# Patient Record
Sex: FEMALE | Race: WHITE | NOT HISPANIC OR LATINO | Employment: FULL TIME | URBAN - METROPOLITAN AREA
[De-identification: names, ages, dates, MRNs, and addresses within clinical notes are randomized per-mention and may not be internally consistent; named-entity substitution may affect disease eponyms.]

---

## 2017-08-16 PROBLEM — Z12.11 SCREENING FOR COLON CANCER: Status: ACTIVE | Noted: 2017-08-16

## 2018-04-02 PROBLEM — L25.5 CONTACT DERMATITIS DUE TO PLANT: Status: ACTIVE | Noted: 2018-04-02

## 2020-02-02 PROBLEM — L25.5 CONTACT DERMATITIS DUE TO PLANT: Status: RESOLVED | Noted: 2018-04-02 | Resolved: 2020-02-02

## 2020-02-02 PROBLEM — Z12.11 SCREENING FOR COLON CANCER: Status: RESOLVED | Noted: 2017-08-16 | Resolved: 2020-02-02

## 2020-10-19 ENCOUNTER — PATIENT MESSAGE (OUTPATIENT)
Dept: OTHER | Facility: OTHER | Age: 60
End: 2020-10-19

## 2022-05-19 PROBLEM — R53.82 CHRONIC FATIGUE, UNSPECIFIED: Status: ACTIVE | Noted: 2022-05-19

## 2022-06-10 ENCOUNTER — TELEPHONE (OUTPATIENT)
Dept: OBSTETRICS AND GYNECOLOGY | Facility: CLINIC | Age: 62
End: 2022-06-10
Payer: COMMERCIAL

## 2022-06-10 NOTE — TELEPHONE ENCOUNTER
----- Message from Godfrey Majano sent at 6/10/2022 12:04 PM CDT -----  Regarding: Appt access  Contact: [phone]  Request Appt    Who Called: Rachelle Bartlett Type: Annual Wellness Exam  Call Back Number: [phone]  Additional Information: The patient called to schedule a annual wellness exam.

## 2022-08-02 ENCOUNTER — OFFICE VISIT (OUTPATIENT)
Dept: OBSTETRICS AND GYNECOLOGY | Facility: CLINIC | Age: 62
End: 2022-08-02
Payer: COMMERCIAL

## 2022-08-02 VITALS
DIASTOLIC BLOOD PRESSURE: 82 MMHG | BODY MASS INDEX: 36.74 KG/M2 | SYSTOLIC BLOOD PRESSURE: 128 MMHG | WEIGHT: 234.56 LBS

## 2022-08-02 DIAGNOSIS — Z01.419 SMEAR, VAGINAL, AS PART OF ROUTINE GYNECOLOGICAL EXAMINATION: Primary | ICD-10-CM

## 2022-08-02 DIAGNOSIS — Z12.72 SMEAR, VAGINAL, AS PART OF ROUTINE GYNECOLOGICAL EXAMINATION: Primary | ICD-10-CM

## 2022-08-02 DIAGNOSIS — Z01.419 ENCOUNTER FOR ANNUAL ROUTINE GYNECOLOGICAL EXAMINATION: ICD-10-CM

## 2022-08-02 PROCEDURE — 1159F MED LIST DOCD IN RCRD: CPT | Mod: CPTII,S$GLB,, | Performed by: OBSTETRICS & GYNECOLOGY

## 2022-08-02 PROCEDURE — 3079F PR MOST RECENT DIASTOLIC BLOOD PRESSURE 80-89 MM HG: ICD-10-PCS | Mod: CPTII,S$GLB,, | Performed by: OBSTETRICS & GYNECOLOGY

## 2022-08-02 PROCEDURE — 3008F BODY MASS INDEX DOCD: CPT | Mod: CPTII,S$GLB,, | Performed by: OBSTETRICS & GYNECOLOGY

## 2022-08-02 PROCEDURE — 1160F RVW MEDS BY RX/DR IN RCRD: CPT | Mod: CPTII,S$GLB,, | Performed by: OBSTETRICS & GYNECOLOGY

## 2022-08-02 PROCEDURE — 99396 PREV VISIT EST AGE 40-64: CPT | Mod: S$GLB,,, | Performed by: OBSTETRICS & GYNECOLOGY

## 2022-08-02 PROCEDURE — 88142 CYTOPATH C/V THIN LAYER: CPT | Performed by: OBSTETRICS & GYNECOLOGY

## 2022-08-02 PROCEDURE — 3008F PR BODY MASS INDEX (BMI) DOCUMENTED: ICD-10-PCS | Mod: CPTII,S$GLB,, | Performed by: OBSTETRICS & GYNECOLOGY

## 2022-08-02 PROCEDURE — 1159F PR MEDICATION LIST DOCUMENTED IN MEDICAL RECORD: ICD-10-PCS | Mod: CPTII,S$GLB,, | Performed by: OBSTETRICS & GYNECOLOGY

## 2022-08-02 PROCEDURE — 99999 PR PBB SHADOW E&M-EST. PATIENT-LVL IV: ICD-10-PCS | Mod: PBBFAC,,, | Performed by: OBSTETRICS & GYNECOLOGY

## 2022-08-02 PROCEDURE — 99999 PR PBB SHADOW E&M-EST. PATIENT-LVL IV: CPT | Mod: PBBFAC,,, | Performed by: OBSTETRICS & GYNECOLOGY

## 2022-08-02 PROCEDURE — 99396 PR PREVENTIVE VISIT,EST,40-64: ICD-10-PCS | Mod: S$GLB,,, | Performed by: OBSTETRICS & GYNECOLOGY

## 2022-08-02 PROCEDURE — 3074F PR MOST RECENT SYSTOLIC BLOOD PRESSURE < 130 MM HG: ICD-10-PCS | Mod: CPTII,S$GLB,, | Performed by: OBSTETRICS & GYNECOLOGY

## 2022-08-02 PROCEDURE — 3074F SYST BP LT 130 MM HG: CPT | Mod: CPTII,S$GLB,, | Performed by: OBSTETRICS & GYNECOLOGY

## 2022-08-02 PROCEDURE — 1160F PR REVIEW ALL MEDS BY PRESCRIBER/CLIN PHARMACIST DOCUMENTED: ICD-10-PCS | Mod: CPTII,S$GLB,, | Performed by: OBSTETRICS & GYNECOLOGY

## 2022-08-02 PROCEDURE — 3079F DIAST BP 80-89 MM HG: CPT | Mod: CPTII,S$GLB,, | Performed by: OBSTETRICS & GYNECOLOGY

## 2022-08-02 NOTE — PROGRESS NOTES
Chief Complaint   Patient presents with    Well Woman       History and Physical:  No LMP recorded. Patient has had a hysterectomy.       Claudia Milian is a 61 y.o. WFwho presents today for her routine annual GYN exam. The patient has no Gynecology complaints today. covid 7/2020, and 3 weeks ago      Allergies:   Review of patient's allergies indicates:   Allergen Reactions    Mobic [meloxicam] Nausea Only    Naproxen      Other reaction(s): STOMACH PAIN    Scopolamine base Nausea Only       Past Medical History:   Diagnosis Date    a Chronic Tachycardia ###    Dr. Sukhwinder Lo; On Metoprolol 50 Mg Bid    a H/O Paroxysmal Supraventricular Tachycardia (PSVT) ###    Dr. Sukhwinder Lo; On Metoprolol 50 Mg Bid    b Hypertension     8/22/19 RXd A Low Salt Diet And Home BP Monitoring Parameters    e Chronic Mildly Elevated TSH Levels With Normal FT4s     7/5/20 Ordered A Thyroid U/S; Am Monitoring    f Osteopenia     2/5/20 Offered RX For Fosamax 35 Mg Weekly, And RXd OTC D3 2K IU Daily, And OTC CaCO3 1,200 Mg Daily    i H/O 2 PPD X 20 YRs TUD, Quit In 1997     i H/O COVID-19 Infection     Her 8/25/20 COVID-19 Swab Test = Was Positive    j GERD     k H/O Bilateral Breast Augmentation Mammoplasty     l Chronic Recurrent Low Back Pain     l H/O Left Knee Arthroscopic Surgery     m Borderline Vitamin B12 Deficiency ###    2/2/20 RXd OTC Vitamin B12 2,500 Mcg SL Daily    m Chronic Fatigue     5/19/22 Referred To Dr. Jes Davidson    m Poor Concentration ###    o Allergic Rhinosinusitis     q Borderline Vitamin D Deficiency     5/16/21 RXd OTC D3 5K IU Daily    Wellness Visit 5/19/2022        Past Surgical History:   Procedure Laterality Date    BREAST BIOPSY Left     benign needle biopsy    BREAST SURGERY Bilateral 2005    implants    COLONOSCOPY N/A 8/16/2017    Procedure: COLONOSCOPY;  Surgeon: INES Orantes MD;  Location: [facility];  Service: Endoscopy;  Laterality: N/A;    HYSTERECTOMY       left knee      OOPHORECTOMY Bilateral 2009       MEDS:   Current Outpatient Medications on File Prior to Visit   Medication Sig Dispense Refill    cholecalciferol, vitamin D3, 125 mcg (5,000 unit) Tab Take 1 tablet (5,000 Units total) by mouth once daily.      cyanocobalamin, vitamin B-12, (VITAMIN B-12) 2,500 mcg Subl Place 2,500 mcg under the tongue once daily.      fluticasone propionate (FLONASE) 50 mcg/actuation nasal spray use 2 sprays in each nostril once daily 16 g 2    ibuprofen (ADVIL,MOTRIN) 800 MG tablet TAKE 1 TABLET BY MOUTH 3 TIMES DAILY AS NEEDED FOR PAIN 90 tablet 0    levocetirizine (XYZAL) 5 MG tablet Take 1 tablet (5 mg total) by mouth once daily. 90 tablet 1    metoprolol tartrate (LOPRESSOR) 50 MG tablet TAKE 1 TABLET BY MOUTH TWICE DAILY 180 tablet 1    montelukast (SINGULAIR) 10 mg tablet TAKE 1 TABLET BY MOUTH AT BEDTIME AS NEEDED FOR allergy symptoms 90 tablet 3    omeprazole (PRILOSEC) 20 MG capsule Take 1 capsule (20 mg total) by mouth once daily. 90 capsule 1    rosuvastatin (CRESTOR) 5 MG tablet Take 1 tablet (5 mg total) by mouth every evening. 90 tablet 3    aspirin (ECOTRIN) 81 MG EC tablet Take 1 tablet (81 mg total) by mouth once daily.  0    [] benzonatate (TESSALON PERLES) 100 MG capsule Take 1 capsule (100 mg total) by mouth 3 (three) times daily as needed for Cough. 30 capsule 0    meclizine (ANTIVERT) 25 mg tablet TAKE 1 TABLET BY MOUTH TWICE DAILY AS NEEDED (Patient not taking: No sig reported) 20 tablet 0    tramadol-acetaminophen 37.5-325 mg (ULTRACET) 37.5-325 mg Tab TAKE 1 TABLET BY MOUTH EVERY 6 HOURS AS NEEDED FOR PAIN (Patient not taking: No sig reported) 30 tablet 0    [DISCONTINUED] calcium carbonate (OS-AMANDA) 600 mg calcium (1,500 mg) Tab Take 2 tablets (1,200 mg total) by mouth once daily. (Patient not taking: No sig reported)       No current facility-administered medications on file prior to visit.       OB History        2     Para   2    Term   2            AB        Living           SAB        IAB        Ectopic        Multiple        Live Births                     Social History     Socioeconomic History    Marital status:    Tobacco Use    Smoking status: Former Smoker     Packs/day: 2.00     Years: 20.00     Pack years: 40.00    Smokeless tobacco: Never Used   Substance and Sexual Activity    Alcohol use: Yes     Alcohol/week: 1.0 standard drink     Types: 1 Cans of beer per week     Comment: social/occ    Drug use: Never    Sexual activity: Not Currently     Social Determinants of Health     Financial Resource Strain: Low Risk     Difficulty of Paying Living Expenses: Not very hard   Food Insecurity: No Food Insecurity    Worried About Running Out of Food in the Last Year: Never true    Ran Out of Food in the Last Year: Never true   Transportation Needs: No Transportation Needs    Lack of Transportation (Medical): No    Lack of Transportation (Non-Medical): No   Physical Activity: Sufficiently Active    Days of Exercise per Week: 5 days    Minutes of Exercise per Session: 60 min   Stress: No Stress Concern Present    Feeling of Stress : Only a little   Social Connections: Unknown    Frequency of Communication with Friends and Family: More than three times a week    Frequency of Social Gatherings with Friends and Family: Twice a week    Active Member of Clubs or Organizations: Yes    Attends Club or Organization Meetings: More than 4 times per year    Marital Status: Living with partner   Housing Stability: Low Risk     Unable to Pay for Housing in the Last Year: No    Number of Places Lived in the Last Year: 1    Unstable Housing in the Last Year: No       Family History   Problem Relation Age of Onset    Breast cancer Neg Hx     Cancer Neg Hx     Colon cancer Neg Hx     Ovarian cancer Neg Hx          Past medical and surgical history reviewed.   I have reviewed the patient's medical  history in detail and updated the computerized patient record.        Review of System:   General: no chills, fever, night sweats, weight gain or weight loss  Psychological: no depression or suicidal ideation  Breasts: no new or changing breast lumps, nipple discharge or masses.  Respiratory: no cough, shortness of breath, or wheezing  Cardiovascular: no chest pain or dyspnea on exertion  Gastrointestinal: no abdominal pain, change in bowel habits, or black or bloody stools  Genito-Urinary: no incontinence, urinary frequency/urgency or vulvar/vaginal symptoms, pelvic pain or abnormal vaginal bleeding.  Musculoskeletal: no gait disturbance or muscular weakness      Physical Exam:   /82   Wt 106.4 kg (234 lb 9.1 oz)   BMI 36.74 kg/m²   Constitutional: She is oriented to person, place, and time. She appears well-developed and well-nourished. No distress.  HENT:   Head: Normocephalic and atraumatic.   Eyes: Conjunctivae and EOM are normal. No scleral icterus.   Neck: Normal range of motion. Neck supple. No tracheal deviation present.   Cardiovascular: Normal rate.    Pulmonary/Chest: Effort normal. No respiratory distress. She exhibits no tenderness.  Breasts: are symmetrical.no masses   Right breast exhibits no inverted nipple, no mass, no nipple discharge, no skin change and no tenderness.   Left breast exhibits no inverted nipple, no mass, no nipple discharge, no skin change and no tenderness.  Abdominal: Soft. She exhibits no distension and no mass. There is no tenderness. There is no rebound and no guarding.   Genitourinary:    External rectal exam shows no thrombosed external hemorrhoids.    Pelvic exam was performed with patient supine.   No labial fusion.   There is no rash, lesion or injury on the right labia.   There is no rash, lesion or injury on the left labia.   No bleeding and no signs of injury around the vaginal introitus, urethra is without lesions and well supported.    No vaginal discharge  found.    No significant Cystocele, Enterocele or rectocele, and cuff well supported.   Bimanual exam:   The urethra and vagina are without palpable masses or tenderness.   Uterus and cervix are surgically absents, vaginal cuff is intact and well supported.   Right adnexum displays no mass and no tenderness.   Left adnexum displays no mass and no tenderness.  Musculoskeletal: Normal range of motion.   Lymphadenopathy: No inguinal adenopathy present.   Neurological: She is alert and oriented to person, place, and time. Coordination normal.   Skin: Skin is warm and dry. She is not diaphoretic.   Psychiatric: She has a normal mood and affect.        Assessment:   Normal annual GYN exam  1. Smear, vaginal, as part of routine gynecological examination     2. Encounter for annual routine gynecological examination       Prev hyst    Plan:   PAP   Mammogram done  Follow up in 1 year.

## 2022-08-10 LAB
FINAL PATHOLOGIC DIAGNOSIS: NORMAL
Lab: NORMAL

## 2022-11-15 ENCOUNTER — TELEPHONE (OUTPATIENT)
Dept: OBSTETRICS AND GYNECOLOGY | Facility: CLINIC | Age: 62
End: 2022-11-15
Payer: COMMERCIAL

## 2022-11-15 NOTE — TELEPHONE ENCOUNTER
Last seen 8/2/2022 for wwgeovanny we have not sent this in the past for her. LVM that she can call to further discusses  or schedule an appointment to to talk about medication to call and schedule.

## 2022-11-15 NOTE — TELEPHONE ENCOUNTER
----- Message from Estee Jose sent at 11/15/2022  8:51 AM CST -----  Contact: Patient  Type:  RX Refill Request    Who Called:  Patient  Refill or New Rx: RX  RX Name and Strength: OZEMPIC  How is the patient currently taking it? (ex. 1XDay): 1DAY  Is this a 30 day or 90 day RX: 90  Preferred Pharmacy with phone number:   [facility]. - [address] [address]  Phone: [phone] Fax: [phone]     Best Call Back Number:  [phone]   Additional Information:  Patient requesting doctor 's office to call in  rx

## 2023-04-06 ENCOUNTER — TELEPHONE (OUTPATIENT)
Dept: NEUROSURGERY | Facility: CLINIC | Age: 63
End: 2023-04-06
Payer: COMMERCIAL

## 2023-04-06 NOTE — TELEPHONE ENCOUNTER
Called patient to schedule a NP appt with Dr. Campoverde per referral from Jose Michel MD. No answer.  LVM. No current imaging in the chart.

## 2023-04-21 ENCOUNTER — OFFICE VISIT (OUTPATIENT)
Dept: NEUROSURGERY | Facility: CLINIC | Age: 63
End: 2023-04-21
Payer: COMMERCIAL

## 2023-04-21 VITALS — HEIGHT: 67 IN | RESPIRATION RATE: 18 BRPM | BODY MASS INDEX: 35.02 KG/M2 | WEIGHT: 223.13 LBS

## 2023-04-21 DIAGNOSIS — M48.07 SPINAL STENOSIS, LUMBOSACRAL REGION: ICD-10-CM

## 2023-04-21 DIAGNOSIS — M54.50 CHRONIC LOW BACK PAIN, UNSPECIFIED BACK PAIN LATERALITY, UNSPECIFIED WHETHER SCIATICA PRESENT: ICD-10-CM

## 2023-04-21 DIAGNOSIS — M54.9 DORSALGIA, UNSPECIFIED: Primary | ICD-10-CM

## 2023-04-21 DIAGNOSIS — M47.12 CERVICAL SPONDYLOSIS WITH MYELOPATHY AND RADICULOPATHY: ICD-10-CM

## 2023-04-21 DIAGNOSIS — M48.062 LUMBAR STENOSIS WITH NEUROGENIC CLAUDICATION: ICD-10-CM

## 2023-04-21 DIAGNOSIS — M47.22 CERVICAL SPONDYLOSIS WITH MYELOPATHY AND RADICULOPATHY: ICD-10-CM

## 2023-04-21 DIAGNOSIS — G89.29 CHRONIC LOW BACK PAIN, UNSPECIFIED BACK PAIN LATERALITY, UNSPECIFIED WHETHER SCIATICA PRESENT: ICD-10-CM

## 2023-04-21 PROCEDURE — 1160F RVW MEDS BY RX/DR IN RCRD: CPT | Mod: CPTII,S$GLB,, | Performed by: PHYSICIAN ASSISTANT

## 2023-04-21 PROCEDURE — 3008F PR BODY MASS INDEX (BMI) DOCUMENTED: ICD-10-PCS | Mod: CPTII,S$GLB,, | Performed by: PHYSICIAN ASSISTANT

## 2023-04-21 PROCEDURE — 99204 PR OFFICE/OUTPT VISIT, NEW, LEVL IV, 45-59 MIN: ICD-10-PCS | Mod: S$GLB,,, | Performed by: PHYSICIAN ASSISTANT

## 2023-04-21 PROCEDURE — 3008F BODY MASS INDEX DOCD: CPT | Mod: CPTII,S$GLB,, | Performed by: PHYSICIAN ASSISTANT

## 2023-04-21 PROCEDURE — 99204 OFFICE O/P NEW MOD 45 MIN: CPT | Mod: S$GLB,,, | Performed by: PHYSICIAN ASSISTANT

## 2023-04-21 PROCEDURE — 1159F PR MEDICATION LIST DOCUMENTED IN MEDICAL RECORD: ICD-10-PCS | Mod: CPTII,S$GLB,, | Performed by: PHYSICIAN ASSISTANT

## 2023-04-21 PROCEDURE — 1159F MED LIST DOCD IN RCRD: CPT | Mod: CPTII,S$GLB,, | Performed by: PHYSICIAN ASSISTANT

## 2023-04-21 PROCEDURE — 1160F PR REVIEW ALL MEDS BY PRESCRIBER/CLIN PHARMACIST DOCUMENTED: ICD-10-PCS | Mod: CPTII,S$GLB,, | Performed by: PHYSICIAN ASSISTANT

## 2023-04-21 NOTE — PROGRESS NOTES
[facility] Neurosurgery [facility]  Clinic Consult     Consult Requested By: Jose Michel MD  PCP: Jose Michel MD    SUBJECTIVE:     Chief Complaint:   Chief Complaint   Patient presents with    Lumbar Spine Pain (L-Spine)     Patient present to clinic today as neck pain. Patient states of numbness/tingling radiating bilateral arms/hands; mostly present at PM more frequent. Also states of back pain; worsening symptoms, hard standing long periods of time. Experiencing numbness/tingling and sharp pain of bilateral legs.        History of Present Illness:  Claudia Milian is a 62 y.o. right handed female with HTN, osteopenia who presents for evaluation of neck and back pain. She reports neck pain for the last few months. She has noticed over the years that her neck has become more stiff and grinds with movement. She will experience sharp pains in the neck and shooting pains down her arms. She notes numbness and tingling in her arms. She denies weakness, but reports dropping objects and difficulties with hand dexterity. She also reports several year history of low back pain which is progressively worsening. The pain is present in the low back and worse with standing. She cannot stand or walk for prolonged periods without severe back pain and numbness in her legs. This resolves when she sits. She has been taking Celebrex with some relief.     Pertinent and recent history, provider evaluations, imaging and data reviewed in EPIC        Past Medical History:   Diagnosis Date    a Chronic Tachycardia ###    Dr. Sukhwinder Lo; On Metoprolol 50 Mg Bid    a H/O Paroxysmal Supraventricular Tachycardia (PSVT) ###    Dr. Sukhwinder Lo; On Metoprolol 50 Mg Bid    b Hypertension     8/22/19 RXd A Low Salt Diet And Home BP Monitoring Parameters    e Chronic Mildly Elevated TSH Levels With Normal FT4s     7/5/20 Ordered A Thyroid U/S; Am Monitoring    f Osteopenia     2/5/20 Offered RX For  Fosamax 35 Mg Weekly, And RXd OTC D3 2K IU Daily, And OTC CaCO3 1,200 Mg Daily    i H/O 2 PPD X 20 YRs TUD, Quit In 1997     i H/O COVID-19 Infection     Her 8/25/20 COVID-19 Swab Test = Was Positive    j GERD     Dr. INES diaz H/O Bilateral Breast Augmentation Mammoplasty     l Chronic Recurrent Low Back Pain     l H/O Left Knee Arthroscopic Surgery     m Borderline Vitamin B12 Deficiency     2/2/20 RXd OTC Vitamin B12 2,500 Mcg SL Daily    m Chronic Fatigue     5/19/22 Referred To Dr. Jes cueva Poor Concentration     o Allergic Rhinosinusitis     q Borderline Vitamin D Deficiency     5/16/21 RXd OTC D3 5K IU Daily    Wellness Visit 3/31/2023      Past Surgical History:   Procedure Laterality Date    BREAST BIOPSY Left     benign needle biopsy    BREAST SURGERY Bilateral 2005    implants    COLONOSCOPY N/A 8/16/2017    Procedure: COLONOSCOPY;  Surgeon: INES Orantes MD;  Location: [facility];  Service: Endoscopy;  Laterality: N/A;    HYSTERECTOMY  2009    left knee      OOPHORECTOMY Bilateral 2009     Family History   Problem Relation Age of Onset    Breast cancer Neg Hx     Cancer Neg Hx     Colon cancer Neg Hx     Ovarian cancer Neg Hx      Social History     Tobacco Use    Smoking status: Former     Packs/day: 2.00     Years: 20.00     Pack years: 40.00     Types: Cigarettes    Smokeless tobacco: Never   Substance Use Topics    Alcohol use: Yes     Alcohol/week: 1.0 standard drink     Types: 1 Cans of beer per week     Comment: social/occ    Drug use: Never      Review of patient's allergies indicates:   Allergen Reactions    Mobic [meloxicam] Nausea Only    Naproxen      Other reaction(s): STOMACH PAIN    Scopolamine base Nausea Only       Current Outpatient Medications:     celecoxib (CELEBREX) 200 MG capsule, Take 1 capsule (200 mg total) by mouth 2 (two) times daily as needed for Pain., Disp: 180 capsule, Rfl: 3    cholecalciferol, vitamin D3, 125 mcg (5,000 unit) Tab, Take 1  "tablet (5,000 Units total) by mouth once daily., Disp: , Rfl:     cyanocobalamin, vitamin B-12, (VITAMIN B-12) 2,500 mcg Subl, Place 2,500 mcg under the tongue once daily., Disp: , Rfl:     fluticasone propionate (FLONASE) 50 mcg/actuation nasal spray, place 2 sprays in each nostril EVERY DAY, Disp: 16 g, Rfl: 1    levocetirizine (XYZAL) 5 MG tablet, TAKE 1 TABLET BY MOUTH once daily, Disp: 90 tablet, Rfl: 1    metoprolol tartrate (LOPRESSOR) 50 MG tablet, TAKE 1 TABLET BY MOUTH TWICE DAILY, Disp: 180 tablet, Rfl: 1    montelukast (SINGULAIR) 10 mg tablet, TAKE 1 TABLET BY MOUTH AT BEDTIME AS NEEDED FOR allergy symptoms, Disp: 90 tablet, Rfl: 3    omeprazole (PRILOSEC) 20 MG capsule, TAKE 1 CAPSULE BY MOUTH once daily, Disp: 90 capsule, Rfl: 1    OZEMPIC 0.25 mg or 0.5 mg (2 mg/3 mL) PnIj, Inject 0.5mg into the skin once weekly AS DIRECTED, Disp: 3 mL, Rfl: 3    rosuvastatin (CRESTOR) 5 MG tablet, Take 1 tablet (5 mg total) by mouth every evening., Disp: 90 tablet, Rfl: 3    aspirin (ECOTRIN) 81 MG EC tablet, Take 1 tablet (81 mg total) by mouth once daily., Disp: , Rfl: 0    Review of Systems:   Constitutional: no fever, chills or night sweats. No changes in weight   Eyes: no visual changes   ENT: no nasal congestion or sore throat   Respiratory: no cough or shortness of breath   Cardiovascular: no chest pain or palpitations   Gastrointestinal: no nausea or vomiting   Genitourinary: no hematuria or dysuria   Integument/Breast: no rash or pruritis   Hematologic/Lymphatic: no easy bruising or lymphadenopathy   Musculoskeletal: +neck pain, back pain   Neurological: no seizures or tremors +numbness  Behavioral/Psych: no auditory or visual hallucinations   Endocrine: no heat or cold intolerance         OBJECTIVE:     Vital Signs (Most Recent):  Resp: 18 (04/21/23 1126)  Estimated body mass index is 34.94 kg/m² as calculated from the following:    Height as of this encounter: 5' 7" (1.702 m).    Weight as of this " encounter: 101.2 kg (223 lb 1.7 oz).    Physical Exam:   General: well developed, well nourished, no distress   Neurologic: Alert and oriented. Thought content appropriate. GCS 15.   Head: normocephalic, atraumatic  Eyes: EOMI  Neck: trachea midline, no JVD   Cardiovascular: no LE edema  Pulmonary: normal respirations, no signs of respiratory distress  Abdomen: non-distended  Sensory: intact to light touch throughout  Skin: Skin is warm, dry and intact    Motor Strength: Moves all extremities spontaneously with good tone. No abnormal movements seen.       Deltoids Triceps Biceps Wrist Extension Wrist Flexion Hand  Interossei   Upper: R 5/5 5/5 5/5 5/5 5/5 5/5 5/5    L 5/5 5/5 5/5 5/5 5/5 5/5 5/5     Iliopsoas Quadriceps Knee  Flexion Tibialis  anterior Gastro- cnemius EHL    Lower: R 5/5 5/5 5/5 5/5 5/5 5/5     L 5/5 5/5 5/5 5/5 5/5 5/5      DTR's: 3+  Dyson: absent  Clonus: absent    Gait: normal          Diagnostic Results:  No imaging    ASSESSMENT/PLAN:     Dorsalgia, unspecified  -     MRI Lumbar Spine Without Contrast; Future; Expected date: 04/21/2023    Spinal stenosis, lumbosacral region  -     MRI Lumbar Spine Without Contrast; Future; Expected date: 04/21/2023    Chronic low back pain, unspecified back pain laterality, unspecified whether sciatica present  -     X-Ray Lumbar Spine Ap Lateral w/Flex Ext; Future; Expected date: 04/21/2023    Lumbar stenosis with neurogenic claudication  -     X-Ray Lumbar Spine Ap Lateral w/Flex Ext; Future; Expected date: 04/21/2023    Cervical spondylosis with myelopathy and radiculopathy  -     MRI Cervical Spine Without Contrast; Future; Expected date: 04/21/2023  -     X-Ray Cervical Spine AP Lat with Flexion  Extension; Future; Expected date: 04/21/2023        Claudia Milian is a 62 y.o. female who presents for evaluation of neck and back issues. Her neck pain is likely related to degenerative changes. However, she complains of upper extremity numbness,  dropping objects and difficulties with hand dexterity. She has hyperreflexia on exam. Her exam and symptoms are concerning for cervical myelopathy. I have ordered MRI Cervical spine to evaluate for cord compression. She reports low back pain and symptoms of neurogenic claudication. I have ordered MRI lumbar spine for evaluation of stenosis with neural compression. I have also ordered dynamic x-rays of her cervical and lumbar spine. I will review all imaging once complete and provide further recommendations.     Patient verbalized understanding of plan. Encouraged to call with any questions or concerns.     This note was partially dictated using voice recognition software, so please excuse any errors that were not corrected.

## 2023-05-05 DIAGNOSIS — M48.07 SPINAL STENOSIS, LUMBOSACRAL REGION: Primary | ICD-10-CM

## 2023-05-09 DIAGNOSIS — M48.07 SPINAL STENOSIS, LUMBOSACRAL REGION: Primary | ICD-10-CM

## 2023-08-29 PROBLEM — E55.9 VITAMIN D DEFICIENCY, UNSPECIFIED: Status: ACTIVE | Noted: 2023-08-29
